# Patient Record
Sex: FEMALE | Race: BLACK OR AFRICAN AMERICAN | Employment: FULL TIME | URBAN - METROPOLITAN AREA
[De-identification: names, ages, dates, MRNs, and addresses within clinical notes are randomized per-mention and may not be internally consistent; named-entity substitution may affect disease eponyms.]

---

## 2017-03-22 ENCOUNTER — OFFICE VISIT (OUTPATIENT)
Dept: FAMILY MEDICINE CLINIC | Facility: CLINIC | Age: 47
End: 2017-03-22

## 2017-03-22 VITALS
HEIGHT: 64.25 IN | BODY MASS INDEX: 28.45 KG/M2 | DIASTOLIC BLOOD PRESSURE: 76 MMHG | RESPIRATION RATE: 18 BRPM | WEIGHT: 166.63 LBS | HEART RATE: 80 BPM | SYSTOLIC BLOOD PRESSURE: 116 MMHG | TEMPERATURE: 98 F

## 2017-03-22 DIAGNOSIS — R10.31 RLQ ABDOMINAL PAIN: ICD-10-CM

## 2017-03-22 DIAGNOSIS — Z12.31 ENCOUNTER FOR SCREENING MAMMOGRAM FOR HIGH-RISK PATIENT: ICD-10-CM

## 2017-03-22 DIAGNOSIS — R19.03 RLQ ABDOMINAL MASS: ICD-10-CM

## 2017-03-22 DIAGNOSIS — E04.9 GOITER: Primary | ICD-10-CM

## 2017-03-22 LAB
APPEARANCE: CLEAR
BILIRUBIN: NEGATIVE
GLUCOSE (URINE DIPSTICK): NEGATIVE MG/DL
KETONES (URINE DIPSTICK): NEGATIVE MG/DL
LEUKOCYTES: NEGATIVE
MULTISTIX LOT#: NORMAL NUMERIC
NITRITE, URINE: NEGATIVE
PH, URINE: 7 (ref 4.5–8)
SPECIFIC GRAVITY: 1.02 (ref 1–1.03)
URINE-COLOR: YELLOW
UROBILINOGEN,SEMI-QN: 4 MG/DL (ref 0–1.9)

## 2017-03-22 PROCEDURE — 87491 CHLMYD TRACH DNA AMP PROBE: CPT | Performed by: FAMILY MEDICINE

## 2017-03-22 PROCEDURE — 99203 OFFICE O/P NEW LOW 30 MIN: CPT | Performed by: FAMILY MEDICINE

## 2017-03-22 PROCEDURE — 87086 URINE CULTURE/COLONY COUNT: CPT | Performed by: FAMILY MEDICINE

## 2017-03-22 PROCEDURE — 87591 N.GONORRHOEAE DNA AMP PROB: CPT | Performed by: FAMILY MEDICINE

## 2017-03-22 PROCEDURE — 81003 URINALYSIS AUTO W/O SCOPE: CPT | Performed by: FAMILY MEDICINE

## 2017-03-22 NOTE — PROGRESS NOTES
HPI:   Anny Nevarez is a 41year old female here with abdominal pain      It started 6 months ago.   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 Right ovarian mass    Pt c/o swollen LN ; alternating neck, groin axillary     abd p well nourished,in no apparent distress  CARDIO: RRR without murmur  LUNGS: clear to auscultation  NECK: supple,no adenopathy, +  thyromegaly  HEENT: atraumatic, normocephalic,ears and throat are clear  EYES:PERRLA, EOMI, normal,conjunctiva are clear  SKIN:

## 2017-03-23 LAB
C TRACH DNA SPEC QL NAA+PROBE: NEGATIVE
N GONORRHOEA DNA SPEC QL NAA+PROBE: NEGATIVE

## 2017-03-30 ENCOUNTER — TELEPHONE (OUTPATIENT)
Dept: FAMILY MEDICINE CLINIC | Facility: CLINIC | Age: 47
End: 2017-03-30

## 2017-03-30 NOTE — TELEPHONE ENCOUNTER
LYNSEY reviewed Thyroid US from [facility] [address] dated 3-24-17: US Thyroid: pt needs to see ENT, left nodules 1.5cm biggest measurement. US Pelvic reviewed per AV: enlarged ovaries, no mass, see GYNE MD for consult.   LMOM for pt to return

## 2017-04-11 NOTE — TELEPHONE ENCOUNTER
Attempted to reach pt, \"mailbox full\" on both pt's and 's cells. Sent pt a letter via email [employer] and also by [employer]. US report still in LE's file with notes.

## 2017-05-19 NOTE — TELEPHONE ENCOUNTER
Called pt's cell, able to leave a voice message this time, [facility] requesting a return call from pt to discuss test results, next step is certified mail.

## 2017-12-18 ENCOUNTER — APPOINTMENT (OUTPATIENT)
Dept: GENERAL RADIOLOGY | Age: 47
End: 2017-12-18
Attending: EMERGENCY MEDICINE

## 2017-12-18 ENCOUNTER — HOSPITAL ENCOUNTER (EMERGENCY)
Age: 47
Discharge: HOME | End: 2017-12-18
Attending: EMERGENCY MEDICINE

## 2017-12-18 VITALS
HEART RATE: 82 BPM | WEIGHT: 160 LBS | DIASTOLIC BLOOD PRESSURE: 74 MMHG | BODY MASS INDEX: 29.44 KG/M2 | RESPIRATION RATE: 18 BRPM | HEIGHT: 62 IN | SYSTOLIC BLOOD PRESSURE: 125 MMHG | TEMPERATURE: 98 F | OXYGEN SATURATION: 98 %

## 2017-12-18 DIAGNOSIS — J20.9 ACUTE BRONCHITIS, UNSPECIFIED ORGANISM: Primary | ICD-10-CM

## 2017-12-18 PROCEDURE — 99283 EMERGENCY DEPT VISIT LOW MDM: CPT

## 2017-12-18 PROCEDURE — 71020 XR CHEST PA + LAT CHEST (CPT=71020): CPT | Performed by: EMERGENCY MEDICINE

## 2017-12-18 RX ORDER — AZITHROMYCIN 250 MG/1
TABLET, FILM COATED ORAL
Qty: 1 PACKAGE | Refills: 0 | Status: SHIPPED | OUTPATIENT
Start: 2017-12-18 | End: 2017-12-23

## 2017-12-18 RX ORDER — HYDROCODONE BITARTRATE AND ACETAMINOPHEN 5; 325 MG/1; MG/1
1-2 TABLET ORAL EVERY 4 HOURS PRN
Qty: 20 TABLET | Refills: 0 | Status: SHIPPED | OUTPATIENT
Start: 2017-12-18 | End: 2017-12-25

## 2017-12-19 NOTE — ED PROVIDER NOTES
Patient Seen in: THE [facility] Emergency Department In [place]    History   Patient presents with:  Dyspnea LEXI SOB (respiratory)    Stated Complaint: cold symptoms with dyspnea    HPI    Fever and productive cough for the past week.   Sore throat and headach 23: 22     Approved by: Xu Garcia MD            ED Course as of Dec 19 0503  ------------------------------------------------------------       MDM     She with lower respiratory tract infection will be treated with a course of azithromycin.   Norco for

## 2019-03-26 ENCOUNTER — OFFICE VISIT (OUTPATIENT)
Dept: FAMILY MEDICINE CLINIC | Facility: CLINIC | Age: 49
End: 2019-03-26
Payer: COMMERCIAL

## 2019-03-26 VITALS
OXYGEN SATURATION: 98 % | HEIGHT: 64 IN | BODY MASS INDEX: 28.85 KG/M2 | TEMPERATURE: 99 F | SYSTOLIC BLOOD PRESSURE: 120 MMHG | RESPIRATION RATE: 16 BRPM | HEART RATE: 90 BPM | DIASTOLIC BLOOD PRESSURE: 62 MMHG | WEIGHT: 169 LBS

## 2019-03-26 DIAGNOSIS — G44.229 CHRONIC TENSION-TYPE HEADACHE, NOT INTRACTABLE: ICD-10-CM

## 2019-03-26 DIAGNOSIS — Z00.00 ROUTINE GENERAL MEDICAL EXAMINATION AT A HEALTH CARE FACILITY: ICD-10-CM

## 2019-03-26 DIAGNOSIS — E04.2 MULTIPLE THYROID NODULES: ICD-10-CM

## 2019-03-26 DIAGNOSIS — Z12.31 ENCOUNTER FOR SCREENING MAMMOGRAM FOR MALIGNANT NEOPLASM OF BREAST: ICD-10-CM

## 2019-03-26 DIAGNOSIS — G43.019 INTRACTABLE MIGRAINE WITHOUT AURA AND WITHOUT STATUS MIGRAINOSUS: Primary | ICD-10-CM

## 2019-03-26 PROCEDURE — 99214 OFFICE O/P EST MOD 30 MIN: CPT | Performed by: INTERNAL MEDICINE

## 2019-03-27 ENCOUNTER — TELEPHONE (OUTPATIENT)
Dept: FAMILY MEDICINE CLINIC | Facility: CLINIC | Age: 49
End: 2019-03-27

## 2019-03-30 ENCOUNTER — HOSPITAL ENCOUNTER (OUTPATIENT)
Dept: ULTRASOUND IMAGING | Age: 49
Discharge: HOME | End: 2019-03-30
Attending: INTERNAL MEDICINE
Payer: COMMERCIAL

## 2019-03-30 ENCOUNTER — HOSPITAL ENCOUNTER (OUTPATIENT)
Dept: MAMMOGRAPHY | Age: 49
Discharge: HOME | End: 2019-03-30
Attending: INTERNAL MEDICINE
Payer: COMMERCIAL

## 2019-03-30 DIAGNOSIS — E04.2 MULTIPLE THYROID NODULES: ICD-10-CM

## 2019-03-30 DIAGNOSIS — Z12.31 ENCOUNTER FOR SCREENING MAMMOGRAM FOR MALIGNANT NEOPLASM OF BREAST: ICD-10-CM

## 2019-03-30 PROCEDURE — 77063 BREAST TOMOSYNTHESIS BI: CPT | Performed by: INTERNAL MEDICINE

## 2019-03-30 PROCEDURE — 76536 US EXAM OF HEAD AND NECK: CPT | Performed by: INTERNAL MEDICINE

## 2019-03-30 PROCEDURE — 77067 SCR MAMMO BI INCL CAD: CPT | Performed by: INTERNAL MEDICINE

## 2019-04-02 ENCOUNTER — TELEPHONE (OUTPATIENT)
Dept: FAMILY MEDICINE CLINIC | Facility: CLINIC | Age: 49
End: 2019-04-02

## 2019-04-02 DIAGNOSIS — E04.1 THYROID NODULE: Primary | ICD-10-CM

## 2019-04-02 NOTE — TELEPHONE ENCOUNTER
Unable to leave message due to pt's VM box full.     Referral processed for THE [facility]    Please call patient back to let her know test results, need for referral            ----- Message from Samaria Ta NP sent at 4/1/2019  8:23 PM CDT -----  Pt

## 2019-04-02 NOTE — TELEPHONE ENCOUNTER
PC to patient. Pt's name of VM, left detailed message for pt to confirm need for additional mammogram views.

## 2019-04-03 ENCOUNTER — TELEPHONE (OUTPATIENT)
Dept: FAMILY MEDICINE CLINIC | Facility: CLINIC | Age: 49
End: 2019-04-03

## 2019-04-03 NOTE — TELEPHONE ENCOUNTER
[place] for Topiramate ER    [facility] [place]  Clinical Review Department  [address], [address] [place] [address]    [phone]  Fax [phone]    Put in [place] Triage

## 2019-04-06 ENCOUNTER — LAB ENCOUNTER (OUTPATIENT)
Dept: LAB | Age: 49
End: 2019-04-06
Attending: INTERNAL MEDICINE
Payer: COMMERCIAL

## 2019-04-06 DIAGNOSIS — G44.041 INTRACTABLE CHRONIC PAROXYSMAL HEMICRANIA: ICD-10-CM

## 2019-04-06 DIAGNOSIS — G43.019 INTRACTABLE MIGRAINE WITHOUT AURA AND WITHOUT STATUS MIGRAINOSUS: ICD-10-CM

## 2019-04-06 DIAGNOSIS — E04.2 MULTIPLE THYROID NODULES: ICD-10-CM

## 2019-04-06 DIAGNOSIS — Z00.00 ROUTINE GENERAL MEDICAL EXAMINATION AT A HEALTH CARE FACILITY: ICD-10-CM

## 2019-04-06 PROCEDURE — 36415 COLL VENOUS BLD VENIPUNCTURE: CPT | Performed by: INTERNAL MEDICINE

## 2019-04-06 PROCEDURE — 80050 GENERAL HEALTH PANEL: CPT | Performed by: INTERNAL MEDICINE

## 2019-04-06 PROCEDURE — 85652 RBC SED RATE AUTOMATED: CPT | Performed by: INTERNAL MEDICINE

## 2019-04-06 PROCEDURE — 82306 VITAMIN D 25 HYDROXY: CPT | Performed by: INTERNAL MEDICINE

## 2019-04-06 PROCEDURE — 84439 ASSAY OF FREE THYROXINE: CPT | Performed by: INTERNAL MEDICINE

## 2019-04-08 NOTE — PROGRESS NOTES
Sharon Carrizales is a 54year old female. HPI:   Pt here for \"a lot of things I need to follow up on\"    Always with frequent headaches, tension type. May or may not take ibuprofen or tylenol for it, which just takes the edge off the pain.  Occ migra developed, well nourished,in no apparent distress  SKIN: warm & dry  HEENT: atraumatic, normocephalic, PERRLA bilaterally, EOMs intact,TMs clear, throat clear  NECK: supple,no adenopathy, mild thyromegaly  LUNGS: CTA, easy breathing  CV: normal S1S2, RRR w

## 2019-04-15 ENCOUNTER — HOSPITAL ENCOUNTER (OUTPATIENT)
Dept: MAMMOGRAPHY | Age: 49
Discharge: HOME | End: 2019-04-15
Attending: INTERNAL MEDICINE
Payer: COMMERCIAL

## 2019-04-15 DIAGNOSIS — R92.2 INCONCLUSIVE MAMMOGRAM: ICD-10-CM

## 2019-04-15 PROCEDURE — 77066 DX MAMMO INCL CAD BI: CPT | Performed by: INTERNAL MEDICINE

## 2019-06-05 RX ORDER — TOPIRAMATE 25 MG/1
CAPSULE, EXTENDED RELEASE ORAL
Qty: 30 CAPSULE | Refills: 0 | Status: SHIPPED | OUTPATIENT
Start: 2019-06-05

## 2019-06-05 NOTE — TELEPHONE ENCOUNTER
Refill x 1 only, patient was to follow up on medication after one month. Please have patient schedule appointment.

## 2019-06-11 ENCOUNTER — HOSPITAL ENCOUNTER (OUTPATIENT)
Age: 49
Discharge: HOME | End: 2019-06-11
Attending: FAMILY MEDICINE
Payer: COMMERCIAL

## 2019-06-11 VITALS
SYSTOLIC BLOOD PRESSURE: 119 MMHG | WEIGHT: 168 LBS | TEMPERATURE: 98 F | RESPIRATION RATE: 18 BRPM | BODY MASS INDEX: 29.77 KG/M2 | HEART RATE: 81 BPM | DIASTOLIC BLOOD PRESSURE: 76 MMHG | HEIGHT: 63 IN | OXYGEN SATURATION: 97 %

## 2019-06-11 DIAGNOSIS — L30.9 DERMATITIS: Primary | ICD-10-CM

## 2019-06-11 PROCEDURE — 99212 OFFICE O/P EST SF 10 MIN: CPT

## 2019-06-11 PROCEDURE — 99202 OFFICE O/P NEW SF 15 MIN: CPT

## 2019-06-11 RX ORDER — DESONIDE 0.5 MG/G
OINTMENT TOPICAL
Qty: 15 G | Refills: 0 | Status: SHIPPED | OUTPATIENT
Start: 2019-06-11

## 2019-06-11 NOTE — ED INITIAL ASSESSMENT (HPI)
Left upper back- can feel rash about 3 weeks ago. She states she felt the rash getting bigger about 4 days ago. rash on the face started yesterday  With redness and slight redness. pt states she used an online anti-itch  ointment from [facility].  Pt states

## 2019-06-12 NOTE — ED PROVIDER NOTES
Patient presents with:  Rash      HPI:     Luan Mitchell is a 54year old female who presents today with a chief complaint of Rash and Itching. Onset of symptoms was abrupt starting 2 days ago. Location: right arm and face.  Associated symptoms incl instructions for your condition today.     Follow Up with:  Jory Guerra DO  [address] [place] [address] [facility]    Schedule an appointment as soon as possible for a visit in 1 week

## 2020-04-06 ENCOUNTER — E-VISIT (OUTPATIENT)
Dept: FAMILY MEDICINE CLINIC | Facility: CLINIC | Age: 50
End: 2020-04-06

## 2020-04-06 DIAGNOSIS — R39.9 UTI SYMPTOMS: Primary | ICD-10-CM

## 2020-04-06 RX ORDER — SULFAMETHOXAZOLE AND TRIMETHOPRIM 800; 160 MG/1; MG/1
1 TABLET ORAL 2 TIMES DAILY
Qty: 14 TABLET | Refills: 0 | Status: SHIPPED | OUTPATIENT
Start: 2020-04-06 | End: 2020-04-13

## 2020-04-06 NOTE — PROGRESS NOTES
Carlos Eduardo Apo is a 52year old female. HPI:   See answers to questions above.  See e visit      Current Outpatient Medications   Medication Sig Dispense Refill   • Sulfamethoxazole-TMP -160 MG Oral Tab per tablet Take 1 tablet by mouth 2 (two)

## 2020-10-28 ENCOUNTER — E-VISIT (OUTPATIENT)
Dept: TELEHEALTH | Age: 50
End: 2020-10-28

## 2020-10-28 DIAGNOSIS — R39.9 UTI SYMPTOMS: Primary | ICD-10-CM

## 2020-10-28 PROCEDURE — 99421 OL DIG E/M SVC 5-10 MIN: CPT | Performed by: NURSE PRACTITIONER

## 2020-10-29 RX ORDER — SULFAMETHOXAZOLE AND TRIMETHOPRIM 800; 160 MG/1; MG/1
1 TABLET ORAL 2 TIMES DAILY
Qty: 10 TABLET | Refills: 0 | Status: SHIPPED | OUTPATIENT
Start: 2020-10-29 | End: 2020-11-03

## 2020-10-29 NOTE — PROGRESS NOTES
Refer to patient Questionnaire and responses. See MyChart messages. 10 minutes spent in direct communication with patient via [facility].

## 2021-02-14 ENCOUNTER — E-VISIT (OUTPATIENT)
Dept: TELEHEALTH | Age: 51
End: 2021-02-14
Payer: COMMERCIAL

## 2021-02-14 DIAGNOSIS — R39.9 UTI SYMPTOMS: Primary | ICD-10-CM

## 2021-02-14 PROCEDURE — 99421 OL DIG E/M SVC 5-10 MIN: CPT | Performed by: NURSE PRACTITIONER

## 2021-02-14 RX ORDER — SULFAMETHOXAZOLE AND TRIMETHOPRIM 800; 160 MG/1; MG/1
1 TABLET ORAL 2 TIMES DAILY
Qty: 10 TABLET | Refills: 0 | Status: SHIPPED | OUTPATIENT
Start: 2021-02-14 | End: 2021-02-19

## 2021-02-14 NOTE — PROGRESS NOTES
Refer to patient Questionnaire and responses. See MyChart messages. 10 minutes spent in direct communication with patient via [facility].

## 2021-02-22 ENCOUNTER — E-VISIT (OUTPATIENT)
Dept: TELEHEALTH | Age: 51
End: 2021-02-22

## 2021-02-22 DIAGNOSIS — R30.0 DYSURIA: Primary | ICD-10-CM

## 2021-02-22 PROCEDURE — 99421 OL DIG E/M SVC 5-10 MIN: CPT | Performed by: NURSE PRACTITIONER

## 2021-02-23 RX ORDER — NITROFURANTOIN 25; 75 MG/1; MG/1
100 CAPSULE ORAL 2 TIMES DAILY
Qty: 14 CAPSULE | Refills: 0 | Status: SHIPPED | OUTPATIENT
Start: 2021-02-23 | End: 2021-03-02

## 2024-06-11 ENCOUNTER — E-VISIT (OUTPATIENT)
Dept: TELEHEALTH | Age: 54
End: 2024-06-11

## 2024-06-11 DIAGNOSIS — R39.9 URINARY TRACT INFECTION SYMPTOMS: Primary | ICD-10-CM

## 2024-06-11 PROCEDURE — 99421 OL DIG E/M SVC 5-10 MIN: CPT | Performed by: NURSE PRACTITIONER

## 2024-06-14 NOTE — PROGRESS NOTES
Shey Obrien is a 54 year old female submitting e-visit for urinary symptoms.  HPI:   See answers to questionnaire and [employer] message exchange    Current Outpatient Medications   Medication Sig Dispense Refill    Desonide 0.05 % External Ointment One application twice daily for 7days 15 g 0    TROKENDI XR 25 MG Oral Capsule SR 24 Hr TAKE 1 CAPSULE BY MOUTH NIGHTLY 30 capsule 0    Multiple Vitamin (MULTIVITAMIN OR) Take  by mouth.        Past Medical History:    Insomnia, unspecified    Meningitis (HCC)    Tachycardia, unspecified    Thyroid disease      No past surgical history on file.   No family history on file.   Social History:  Social History     Socioeconomic History    Marital status: Single   Tobacco Use    Smoking status: Never    Smokeless tobacco: Never   Substance and Sexual Activity    Alcohol use: No    Drug use: No   Other Topics Concern    Caffeine Concern Yes    Exercise Yes     Comment: no regular regimen         ASSESSMENT AND PLAN:       Diagnoses and all orders for this visit:    Urinary tract infection symptoms  -     Urine Culture, Routine; Future  -     Urinalysis, Routine; Future      Urine testing ordered.   Additional e-visit questions sent to pt.  Message was read but no reply rec'd for > 48 hrs.   Patient advised to follow up with PCP if still experiencing symptoms  E-visit closed  Refer to [employer] exchange for details.       Duration of  the service:  5 minutes

## 2024-06-19 ENCOUNTER — LAB ENCOUNTER (OUTPATIENT)
Dept: LAB | Age: 54
End: 2024-06-19
Attending: NURSE PRACTITIONER

## 2024-06-19 DIAGNOSIS — R39.9 URINARY TRACT INFECTION SYMPTOMS: ICD-10-CM

## 2024-06-19 LAB
BILIRUB UR QL: NEGATIVE
COLOR UR: YELLOW
GLUCOSE UR-MCNC: NORMAL MG/DL
LEUKOCYTE ESTERASE UR QL STRIP.AUTO: 75
NITRITE UR QL STRIP.AUTO: NEGATIVE
PH UR: 7.5 [PH] (ref 5–8)
PROT UR-MCNC: 30 MG/DL
RBC #/AREA URNS AUTO: >10 /HPF
SP GR UR STRIP: 1.02 (ref 1–1.03)
UROBILINOGEN UR STRIP-ACNC: 4

## 2024-06-19 PROCEDURE — 87077 CULTURE AEROBIC IDENTIFY: CPT | Performed by: NURSE PRACTITIONER

## 2024-06-19 PROCEDURE — 81001 URINALYSIS AUTO W/SCOPE: CPT | Performed by: NURSE PRACTITIONER

## 2024-06-19 PROCEDURE — 87086 URINE CULTURE/COLONY COUNT: CPT | Performed by: NURSE PRACTITIONER

## 2024-06-20 ENCOUNTER — E-VISIT (OUTPATIENT)
Dept: TELEHEALTH | Age: 54
End: 2024-06-20

## 2024-06-20 ENCOUNTER — OFFICE VISIT (OUTPATIENT)
Dept: OCCUPATIONAL MEDICINE | Age: 54
End: 2024-06-20
Attending: PHYSICIAN ASSISTANT

## 2024-06-20 DIAGNOSIS — N30.01 ACUTE CYSTITIS WITH HEMATURIA: Primary | ICD-10-CM

## 2024-06-20 PROCEDURE — 99421 OL DIG E/M SVC 5-10 MIN: CPT | Performed by: PHYSICIAN ASSISTANT

## 2024-06-20 NOTE — PROGRESS NOTES
Shey Obrien is a 54 year old female who initiated e-visit care today.    HPI:   See answers to questionnaire submission     Current Outpatient Medications   Medication Sig Dispense Refill    nitrofurantoin monohydrate macro 100 MG Oral Cap Take 1 capsule (100 mg total) by mouth 2 (two) times daily for 5 days. 10 capsule 0      Past Medical History:    Insomnia, unspecified    Meningitis (HCC)    Tachycardia, unspecified    Thyroid disease      History reviewed. No pertinent surgical history.   History reviewed. No pertinent family history.   Social History:  Social History     Socioeconomic History    Marital status: Single   Tobacco Use    Smoking status: Never    Smokeless tobacco: Never   Substance and Sexual Activity    Alcohol use: No    Drug use: No   Other Topics Concern    Caffeine Concern Yes    Exercise Yes     Comment: no regular regimen         ASSESSMENT AND PLAN:       Diagnoses and all orders for this visit:    Acute cystitis with hematuria       Nitrofurantoin sent to pharmacy as noted in [employer] message. Previous Evisit reviewed, dated 10 days ago.  Patient gave UA yesterday which was c/w with UTI.  Urine culture pending.  Comfort care provided to patient    Duration of  the service:  8 minutes      See [employer] message exchange and Patient Instructions for Comfort Care and patient education.

## (undated) NOTE — Clinical Note
[address] [facility], [facility] Susa Hash, [address]  [phone]             4/11/2017      Dear Marco Scott,    We have attempted reaching you regarding test results. Please call our office at [phone].

## (undated) NOTE — MR AVS SNAPSHOT
[address]  [address] [id number] [phone]               Thank you for choosing us for your health care visit with Janessa Zaragoza DO.   We are glad to serve you and happy to provide you with this rdz call [facility] at [phone]. Wednesday March 22, 2017     Imaging:  US THYROID ([id number])    Instructions: To schedule an appointment for your radiology test please call [facility] Scheduling at [phone]. Don’t eat while distracted and slow down. Avoid over sized portions. Don’t eat while when you’re bored.      EAT THESE FOODS MORE OFTEN: EAT THESE FOODS LESS OFTEN:   Make half your plate fruits and vegetables Highly refined, white starches including wh

## (undated) NOTE — Clinical Note
04/21/2017        Aram Johnson [id number]      Dear Francesca Felix,    [address] [facility] records indicate that you have outstanding lab work and or testing that was ordered for you and has not yet been completed:      T4 RASHID Tsai

## (undated) NOTE — ED AVS SNAPSHOT
Madalyn Craven   MRN: [id number]    Department:  [address] [facility] in [place]   Date of Visit:  12/18/2017           Disclosure     Insurance plans vary and the physician(s) referred by the ER may not be covered by your plan.  Please tell this physician (or your personal doctor if your instructions are to return to your personal doctor) about any new or lasting problems. The primary care or specialist physician will see patients referred from the [facility] Emergency Department.  Radha Raymond